# Patient Record
Sex: MALE | Race: WHITE
[De-identification: names, ages, dates, MRNs, and addresses within clinical notes are randomized per-mention and may not be internally consistent; named-entity substitution may affect disease eponyms.]

---

## 2020-10-20 ENCOUNTER — HOSPITAL ENCOUNTER (OUTPATIENT)
Dept: HOSPITAL 95 - MHTC | Age: 75
Discharge: HOME | End: 2020-10-20
Attending: INTERNAL MEDICINE
Payer: OTHER GOVERNMENT

## 2020-10-20 VITALS — HEIGHT: 65 IN | WEIGHT: 169.76 LBS | BODY MASS INDEX: 28.28 KG/M2

## 2020-10-20 DIAGNOSIS — J44.9: ICD-10-CM

## 2020-10-20 DIAGNOSIS — I42.9: ICD-10-CM

## 2020-10-20 DIAGNOSIS — I11.0: ICD-10-CM

## 2020-10-20 DIAGNOSIS — I27.20: ICD-10-CM

## 2020-10-20 DIAGNOSIS — Z79.899: ICD-10-CM

## 2020-10-20 DIAGNOSIS — Z79.51: ICD-10-CM

## 2020-10-20 DIAGNOSIS — Z79.82: ICD-10-CM

## 2020-10-20 DIAGNOSIS — I44.7: ICD-10-CM

## 2020-10-20 DIAGNOSIS — Z95.5: ICD-10-CM

## 2020-10-20 DIAGNOSIS — I73.9: ICD-10-CM

## 2020-10-20 DIAGNOSIS — I48.0: ICD-10-CM

## 2020-10-20 DIAGNOSIS — Z88.8: ICD-10-CM

## 2020-10-20 DIAGNOSIS — I50.20: ICD-10-CM

## 2020-10-20 DIAGNOSIS — F43.10: ICD-10-CM

## 2020-10-20 DIAGNOSIS — G47.33: ICD-10-CM

## 2020-10-20 DIAGNOSIS — I47.2: Primary | ICD-10-CM

## 2020-10-20 DIAGNOSIS — F17.210: ICD-10-CM

## 2020-10-20 DIAGNOSIS — I25.10: ICD-10-CM

## 2020-10-20 DIAGNOSIS — I47.1: ICD-10-CM

## 2020-10-20 DIAGNOSIS — M10.9: ICD-10-CM

## 2020-10-20 PROCEDURE — 4A023N8 MEASUREMENT OF CARDIAC SAMPLING AND PRESSURE, BILATERAL, PERCUTANEOUS APPROACH: ICD-10-PCS | Performed by: INTERNAL MEDICINE

## 2020-10-20 PROCEDURE — C1894 INTRO/SHEATH, NON-LASER: HCPCS

## 2020-10-20 PROCEDURE — B2111ZZ FLUOROSCOPY OF MULTIPLE CORONARY ARTERIES USING LOW OSMOLAR CONTRAST: ICD-10-PCS | Performed by: INTERNAL MEDICINE

## 2020-10-20 PROCEDURE — C1769 GUIDE WIRE: HCPCS

## 2020-10-20 NOTE — NUR
1845 PATIENT PIV REMOVED. CATHETER TIP INTACT. PRESSURE DRESSING APPLIED. TR
BAND REMOVED, SITE CLEANED AND CLOTH DOT PLACED. WHITE BOARD REPLACED AND
REINFORCED TO PATIENT NOT TO LIFT, FLEX, OR BEND THE RIGHT WRIST. PATIENT
UNDERSTANDS. ALL BELONGINGS GATHERED AND PATIENT DISCHARGED HOME VIA
WHEELCHAIR TO THE VEHICLE WITH WIFE AND GRANDSON.

## 2020-10-20 NOTE — NUR
1820 PATIENT UP TO THE RESTROOM VOID AND BM. BACK OT THE BEDSDIE. GAIT STEADY.
VVS. WIFE AT BEDSIDE TO ASSIST DRESSING.

## 2020-10-20 NOTE — NUR
BEGAN TAKING AIR FROMT HE TR BAND. NO BLEEDING NOTED. WIFE REMAINS AT THE
BEDSIDE AND ALL QUESTIONS ANSWERED. REVIEWED DISCAHRGE INSTRUCTIONS. SURGICAL
CONSULT PENDING AND THE PAITENT WILL BE CALLED IN THE NEXT WEEK. FOLLOW UP
APPOINTMENT WITH DR. RESENDIZ ON 11/2/2020.

## 2020-10-20 NOTE — NUR
PATIENT GETTING OUT OF BED ON HIS OWN. FEFUSES TO LAY BACK DOWN.
STATES THAT HE NEEDS TO GO TO THE REST
ROOM. PATIENT PLACED ON BEDSIDE COMODE. LEFT GROIN SITE STABLE. SOFT AND NO
SWELLING.

## 2020-10-20 NOTE — NUR
ASSUMED CARE OF PATIENT. WIFE/SON AT THE BEDSIDE. DR. RESENDIZ WAS AT THE BEDSIDE
AND SPOKE WITH THE PAITENT BUT UNABLE TO REACH THE WIFE PRIOR TO HER ARRIVA.
RIGHT RADIAL TR BAND IN PLACE WITH 8 ML OF AIR. PATIENT RIGHT WRIST WITHOUT
BLEEDING OR PAIN. PATIENT SITTING UP IN A RECLINDER, MONITOR IN PLACE. CALL
LIGHT IN REACH.

## 2021-10-05 ENCOUNTER — HOSPITAL ENCOUNTER (OUTPATIENT)
Dept: HOSPITAL 95 - MHTC | Age: 76
Discharge: HOME | End: 2021-10-05
Attending: INTERNAL MEDICINE
Payer: OTHER GOVERNMENT

## 2021-10-05 VITALS — BODY MASS INDEX: 25.86 KG/M2 | HEIGHT: 66 IN | WEIGHT: 160.94 LBS

## 2021-10-05 DIAGNOSIS — R55: Primary | ICD-10-CM

## 2021-10-05 PROCEDURE — C1764 EVENT RECORDER, CARDIAC: HCPCS

## 2021-10-05 NOTE — NUR
PT DRESSED SELF WITHOUT ISSUE, SITE UNCHANGED.  PT AND WIFE RECEIVED DISCHARGE
INSTRUCTIONS, MED LIST AND AFTER CARE INSTRUCTIONS; VERBALIZED GOOD
UNDERSTANDING.  [employer] REP REVIEWED DEVICE INSTRUCTIONS WITH PT AND WIFE.
PT LEFT FACILITY VIA W/C, CONDITION STABLE.

## 2021-10-05 NOTE — NUR
PT TOLERATED PROCEDURE WELL.  PT AWAKE AND CONVERSING APPROPRIATELY-NO
SEDATION GIVEN; DENIES PAIN POST PROCEDURE.  PT'S VSS, SEE EHR.  MID CHEST
SITE WITHOUT SWELLING/HEMATOMA, TELFA AND TEGADERM DRSG INTACT.